# Patient Record
Sex: FEMALE | Race: WHITE | NOT HISPANIC OR LATINO | URBAN - METROPOLITAN AREA
[De-identification: names, ages, dates, MRNs, and addresses within clinical notes are randomized per-mention and may not be internally consistent; named-entity substitution may affect disease eponyms.]

---

## 2017-07-11 ENCOUNTER — AMBULATORY - RIVER FALLS (OUTPATIENT)
Dept: FAMILY MEDICINE | Facility: CLINIC | Age: 52
End: 2017-07-11

## 2017-07-18 ENCOUNTER — OFFICE VISIT - RIVER FALLS (OUTPATIENT)
Dept: FAMILY MEDICINE | Facility: CLINIC | Age: 52
End: 2017-07-18

## 2017-07-18 ASSESSMENT — MIFFLIN-ST. JEOR: SCORE: 1505.54

## 2018-02-05 ENCOUNTER — AMBULATORY - RIVER FALLS (OUTPATIENT)
Dept: FAMILY MEDICINE | Facility: CLINIC | Age: 53
End: 2018-02-05

## 2018-02-06 LAB — HBA1C MFR BLD: 5.6 %

## 2018-02-14 ENCOUNTER — OFFICE VISIT - RIVER FALLS (OUTPATIENT)
Dept: FAMILY MEDICINE | Facility: CLINIC | Age: 53
End: 2018-02-14

## 2018-02-14 ASSESSMENT — MIFFLIN-ST. JEOR: SCORE: 1388.51

## 2018-10-11 ENCOUNTER — AMBULATORY - RIVER FALLS (OUTPATIENT)
Dept: FAMILY MEDICINE | Facility: CLINIC | Age: 53
End: 2018-10-11

## 2018-10-12 LAB
CHOLEST SERPL-MCNC: 201 MG/DL
CHOLEST/HDLC SERPL: 4.9 {RATIO}
CREAT SERPL-MCNC: 0.71 MG/DL (ref 0.5–1.05)
GLUCOSE BLD-MCNC: 111 MG/DL (ref 65–99)
HBA1C MFR BLD: 5.8 %
HDLC SERPL-MCNC: 41 MG/DL
LDLC SERPL CALC-MCNC: 119 MG/DL
NONHDLC SERPL-MCNC: 160 MG/DL
TRIGL SERPL-MCNC: 269 MG/DL

## 2018-10-17 ENCOUNTER — OFFICE VISIT - RIVER FALLS (OUTPATIENT)
Dept: FAMILY MEDICINE | Facility: CLINIC | Age: 53
End: 2018-10-17

## 2019-04-30 ENCOUNTER — COMMUNICATION - RIVER FALLS (OUTPATIENT)
Dept: FAMILY MEDICINE | Facility: CLINIC | Age: 54
End: 2019-04-30

## 2019-05-08 ENCOUNTER — AMBULATORY - RIVER FALLS (OUTPATIENT)
Dept: FAMILY MEDICINE | Facility: CLINIC | Age: 54
End: 2019-05-08

## 2019-05-09 LAB
BUN SERPL-MCNC: 17 MG/DL (ref 7–25)
BUN/CREAT RATIO - HISTORICAL: ABNORMAL (ref 6–22)
CALCIUM SERPL-MCNC: 10.4 MG/DL (ref 8.6–10.4)
CHLORIDE BLD-SCNC: 103 MMOL/L (ref 98–110)
CO2 SERPL-SCNC: 29 MMOL/L (ref 20–32)
CREAT SERPL-MCNC: 0.78 MG/DL (ref 0.5–1.05)
EGFRCR SERPLBLD CKD-EPI 2021: 86 ML/MIN/1.73M2
GLUCOSE BLD-MCNC: 117 MG/DL (ref 65–99)
HBA1C MFR BLD: 6 %
POTASSIUM BLD-SCNC: 4.4 MMOL/L (ref 3.5–5.3)
SODIUM SERPL-SCNC: 142 MMOL/L (ref 135–146)

## 2019-05-16 ENCOUNTER — COMMUNICATION - RIVER FALLS (OUTPATIENT)
Dept: FAMILY MEDICINE | Facility: CLINIC | Age: 54
End: 2019-05-16

## 2019-05-21 ENCOUNTER — OFFICE VISIT - RIVER FALLS (OUTPATIENT)
Dept: FAMILY MEDICINE | Facility: CLINIC | Age: 54
End: 2019-05-21

## 2019-05-21 ASSESSMENT — MIFFLIN-ST. JEOR: SCORE: 1395.77

## 2019-05-22 ENCOUNTER — COMMUNICATION - RIVER FALLS (OUTPATIENT)
Dept: FAMILY MEDICINE | Facility: CLINIC | Age: 54
End: 2019-05-22

## 2019-07-15 ENCOUNTER — COMMUNICATION - RIVER FALLS (OUTPATIENT)
Dept: FAMILY MEDICINE | Facility: CLINIC | Age: 54
End: 2019-07-15

## 2019-07-16 ENCOUNTER — COMMUNICATION - RIVER FALLS (OUTPATIENT)
Dept: FAMILY MEDICINE | Facility: CLINIC | Age: 54
End: 2019-07-16

## 2019-10-16 ENCOUNTER — COMMUNICATION - RIVER FALLS (OUTPATIENT)
Dept: FAMILY MEDICINE | Facility: CLINIC | Age: 54
End: 2019-10-16

## 2020-03-10 ENCOUNTER — COMMUNICATION - RIVER FALLS (OUTPATIENT)
Dept: FAMILY MEDICINE | Facility: CLINIC | Age: 55
End: 2020-03-10

## 2020-03-30 ENCOUNTER — COMMUNICATION - RIVER FALLS (OUTPATIENT)
Dept: FAMILY MEDICINE | Facility: CLINIC | Age: 55
End: 2020-03-30

## 2021-05-25 ENCOUNTER — RECORDS - HEALTHEAST (OUTPATIENT)
Dept: ADMINISTRATIVE | Facility: CLINIC | Age: 56
End: 2021-05-25

## 2022-02-11 VITALS
DIASTOLIC BLOOD PRESSURE: 88 MMHG | BODY MASS INDEX: 29.96 KG/M2 | TEMPERATURE: 98.9 F | HEIGHT: 65 IN | SYSTOLIC BLOOD PRESSURE: 144 MMHG | HEART RATE: 76 BPM | WEIGHT: 179.8 LBS

## 2022-02-11 VITALS
HEART RATE: 68 BPM | BODY MASS INDEX: 34.26 KG/M2 | SYSTOLIC BLOOD PRESSURE: 124 MMHG | TEMPERATURE: 98 F | WEIGHT: 205.6 LBS | HEIGHT: 65 IN | DIASTOLIC BLOOD PRESSURE: 80 MMHG

## 2022-02-11 VITALS
DIASTOLIC BLOOD PRESSURE: 80 MMHG | WEIGHT: 181.4 LBS | SYSTOLIC BLOOD PRESSURE: 136 MMHG | HEIGHT: 65 IN | HEART RATE: 64 BPM | BODY MASS INDEX: 30.22 KG/M2 | TEMPERATURE: 97.8 F

## 2022-02-11 VITALS
BODY MASS INDEX: 30.69 KG/M2 | TEMPERATURE: 98.5 F | DIASTOLIC BLOOD PRESSURE: 88 MMHG | WEIGHT: 181.6 LBS | SYSTOLIC BLOOD PRESSURE: 164 MMHG | SYSTOLIC BLOOD PRESSURE: 144 MMHG | DIASTOLIC BLOOD PRESSURE: 80 MMHG | HEART RATE: 64 BPM

## 2022-02-16 NOTE — CARE COORDINATION
Pt appears on [facility]TG chronic disease panel as out of parameters for BP/A1C/LDL.  Pt was seen 10/17/2018, /80, Labs 10/11/18 , A1C 5.8, has RTC 6 mo for CDV/labs.  Pt involved with BP management program.

## 2022-02-16 NOTE — TELEPHONE ENCOUNTER
---------------------  From: Massiel Celeste RN (Phone Messages Pool ([id number]_[facility]))   To: Micaela Huynh;     Sent: 3/30/2020 9:23:37 AM CDT  Subject: FW: Medical Record Authorization           ---------------------  From: BRENDA ANDRADE  To: [facility]  Sent: 03/30/2020 09:21 a.m. CDT  Subject: Medical Record Authorization  Attached is my authorization form to transfer my records to [facility].  The fax number is the headquarters in [place] where the records are all kept and is their preferred way to receive records. Let me know if you have any questions.  Thank you.  ---------------------  From: Micaela Huynh  To: BRENDA ANDRADE  Sent: 3/10/2020 12:45:03 PM CDT  Subject: General Message       Brenda,       In order to transfer records you will need to sign an authorization for Disclosure form. You can either fill one out at the [facility], print one off our website ([email or web address], go to your visit, patient forms and then Authorization for use & disclosure of patient health information) and mail or fax it to us or you can sign one here at [facility]. I can also mail one to you if needed, please let me know your address if you need one mailed.  Once we receive this then we can release records.       Any questions call Medical Records @ [phone]       Thanks,       Micaela Huynh, Release of Information/Transcription   [address] [address]  [phone] DIRECT  [phone] MAIN  [phone] FAX  [email or web address] Brenda @ 9:53am. received authorization and will transfer records today. thanks

## 2022-02-16 NOTE — LETTER
(Inserted Image. Unable to display)       April 18, 2019      PAMROBERT ANDRADE  [address] [place], [place] [id number]          Dear PAM,      Thank you for selecting [facility] for your healthcare needs.     Our records indicate you are due for the following services:    Non-Fasting Labs    If you had your labs done at another facility or with Direct Access Lab Testing at [facility], please bring in a copy of the results to your next visit, mail a copy, or drop off a copy of your results to your Healthcare Provider.    Medication Check  Diabetic Exam ~ Please bring your glucose meter and/or your blood glucose diary to your appointment.  Hypertension Check ~ Please remember to bring any at-home blood pressure readings with you to your appointment.    You are due for lab work and an office visit; please schedule the lab appointment 1 week before the office visit.  This will assure all results are available to discuss with your Healthcare Provider during your visit.    **It is very helpful if you bring your medication bottles to your appointment.  This assures we have all of your current medications, including strength and dosing information, documented accurately in your medical record.    To schedule an appointment or if you have further questions, please contact your primary clinic:   [facility]       [phone]   [facility]       [phone]              [facility]     [phone]    Powered by Health and [employer]    Sincerely,    Truong Fairbanks MD

## 2022-02-16 NOTE — TELEPHONE ENCOUNTER
Entered by Anny Lozada on January 15, 2021 8:17:34 AM CST  This is done      ---------------------  From: Stephen BENEDICT, Gretchen   To: [facility] Pool ([id number]);     Sent: 1/14/2021 3:33:03 PM CST  Subject: General Message     Please remove pt from GTG pt list.  Thanks.

## 2022-02-16 NOTE — TELEPHONE ENCOUNTER
Entered by Graciela Pendleton CMA on May 04, 2020 2:09:49 PM CDT  ---------------------  From: Graciela Pendleton CMA   To: [facility]    Sent: 5/4/2020 2:09:49 PM CDT  Subject: Medication Management     ** Not Approved: Patient needs appointment, due for yearly visit **  hydrochlorothiazide-lisinopril (LISINOP/HCTZ TAB 10-12.5)  TAKE 2 TABLETS DAILY  Qty:  180 tab(s)        Days Supply:  90        Refills:  3          Substitutions Allowed     Route To Pharmacy - [facility]   Signed by Graciela Pendleton CMA            ------------------------------------------  From: [facility]  To: Truong Fairbanks MD  Sent: May 4, 2020 1:46:54 AM CDT  Subject: Medication Management  Due: May 5, 2020 1:46:54 AM CDT    ** On Hold Pending Signature **  Drug: hydrochlorothiazide-lisinopril (hydrochlorothiazide-lisinopril 12.5 mg-10 mg oral tablet)  TAKE 2 TABLETS DAILY  Quantity: 180 tab(s)  Days Supply: 90  Refills: 3  Substitutions Allowed  Notes from Pharmacy:     Dispensed Drug: hydrochlorothiazide-lisinopril (hydrochlorothiazide-lisinopril 12.5 mg-10 mg oral tablet)  TAKE 2 TABLETS DAILY  Quantity: 180 tab(s)  Days Supply: 90  Refills: 3  Substitutions Allowed  Notes from Pharmacy:   ------------------------------------------

## 2022-02-16 NOTE — TELEPHONE ENCOUNTER
---------------------From: Gwen Portillo LPN ([place] Orders Messages Pool ([address] [place] [address])) To: xaitment Pool ([address] [place] [address]);   Sent: 7/15/2019 9:06:35 AM CDTSubject: FW: consumer message Dr. Fairbanks ---------------------From: PAM Ty: [facility] BaptistSent: 07/15/2019 08:09 a.m. CDTSubject: Dr. Nora Fairbanks, can you please sign the attached form and fax it to [employer] [employer] for my 2020 insurance discount please?  I forgot to have you sign it when I was in May 21.  It needs to be received by August 23rd.Also, following up on BP readings: on 5/28 it was 121/79, on 5/30 it was 112/80, on 6/19 it was 117/75, on 7/3 it was 122/82.  Doing well with my BP Cuff.  I have also lost 8 more pounds.Thank you,Karlene---------------------From: Lorena Haynes LPN ([employer] Message Pool ([address] [place] [address])) To: Truong Fairbanks MD;   Sent: 7/15/2019 9:16:32 AM CDTSubject: FW: consumer message Dr. Fairbanks---------------------From: Truong Fairbanks MD To: xaitment Pool ([id number]);   Sent: 7/15/2019 5:31:58 PM CDTSubject: RE: consumer message Dr. Fairbanks signedLVM for patient notified that form was signed and [facility] will be faxing form.

## 2022-02-16 NOTE — TELEPHONE ENCOUNTER
---------------------  From: Truong Fairbanks MD   To: PAM ANDRADE    Sent: 5/16/2019 7:56:17 AM CDT  Subject: Patient Message - Results Notification        Your tests look good.  We will discuss the results at your upcoming visit.    Results:  Date Result Name Ind Value Ref Range   5/8/2019 7:58 AM Sodium Level  142 mmol/L (135 - 146)   5/8/2019 7:58 AM Potassium Level  4.4 mmol/L (3.5 - 5.3)   5/8/2019 7:58 AM Chloride Level  103 mmol/L (98 - 110)   5/8/2019 7:58 AM CO2 Level  29 mmol/L (20 - 32)   5/8/2019 7:58 AM Glucose Level ((H)) 117 mg/dL (65 - 99)   5/8/2019 7:58 AM BUN  17 mg/dL (7 - 25)   5/8/2019 7:58 AM Creatinine Level  0.78 mg/dL (0.50 - 1.05)   5/8/2019 7:58 AM eGFR  86 mL/min/1.73m2 (> OR = 60 - )   5/8/2019 7:58 AM Calcium Level  10.4 mg/dL (8.6 - 10.4)   5/8/2019 8:00 AM Hgb A1c ((H)) 6.0 ( - <5.7)

## 2022-02-16 NOTE — PROGRESS NOTES
Patient:   PAM ANDRADE            MRN: [id number]            FIN: [id number]               Age:   51 years     Sex:  Female     :  1965   Associated Diagnoses:   Type 2 diabetes mellitus; Essential Hypertension; GERD (Gastroesophageal Reflux Disease); Hyperlipidemia NOS   Author:   Truong Fairbanks MD      Visit Information      Date of Service: 2017 07:46 am  Performing Location: [facility]  Encounter#: [id number]      Primary Care Provider (PCP):  Truong Fairbanks MD    NPI# [id number]   Visit type:  On-going care, Scheduled follow-up.         Medication: Follow-up, Refill.    Source of history:  Self, Medical record.    History limitation:  None.       Chief Complaint   2017 7:52 AM CDT    DM/HTN check, f/u labs, refill meds.   also rck mole on upper left thigh.      History of Present Illness             The patient presents for follow-up evaluation of diabetes.  The quality of the patient's diabetes is described as increased hyperglycemic episodes.  Exacerbating factors consist of missed medication, noncompliance with diet and sedentary lifestyle.  Relieving factors consist of medication.  Associated symptoms consist of none.  Compliance problems: with weight management.  Glucose results: rarely checks glucose.  Hemoglobin A1c results: elevated.  Lifestyle modification: weight reduction, diet, increased physical activity.  Medications: metformin .               The patient presents for follow-up evaluation of hypertension.  The quality of hypertension symptom(s) since the patient's last visit is described as improved.  The severity of the hypertension symptom(s) since the last visit is mild.  Since the patient's last visit, the timing/course of hypertension symptom(s) is improving.  The context of the hypertension: blood pressure was maintained within the target range.  Exacerbating factors consist of noncompliance with diet.  Relieving factors consist of medication.   Associated symptoms consist of none.        Interval History   Cholesterol Management   Total cholesterol results 200-239 mg/dL (borderline high).  LDL cholesterol results < 100 mg/dL (optimal).  HDL cholesterol results < 40 mg/dL (low).  Triglyceride results 200-499 mg/dL (high).  The course is improving.        Review of Systems   Constitutional:  No fever, No chills, No sweats, No weakness, No fatigue.    Eye:  No recent visual problem.    Ear/Nose/Mouth/Throat:  No decreased hearing, No nasal congestion, No sore throat.    Respiratory:  No shortness of breath, No cough.    Cardiovascular:  Negative, No chest pain, No palpitations, No peripheral edema.    Gastrointestinal:  No nausea, No vomiting, No diarrhea, No constipation, No heartburn.    Genitourinary:  No dysuria, No change in urine stream.    Hematology/Lymphatics:  No bruising tendency, No bleeding tendency.    Endocrine:  No cold intolerance, No heat intolerance.    Immunologic:  Negative.    Musculoskeletal:  No back pain, No neck pain, No joint pain, No muscle pain.    Integumentary:  No rash, No dryness, No skin lesion.    Neurologic:  Alert and oriented X4, No headache.    Psychiatric:  No anxiety, No depression.       Health Status   Allergies:    Allergic Reactions (Selected)  No known allergies   Medications:  (Selected)   Prescriptions  Prescribed  atorvastatin 40 mg oral tablet: 1 tab(s) ( 40 mg ), po, daily, # 90 tab(s), 2 Refill(s), Type: Maintenance, Pharmacy: [facility], Pt prefers 3months at a time., 1 tab(s) po daily  hydrochlorothiazide 25 mg oral tablet: 1 tab(s) ( 25 mg ), PO, Daily, # 90 tab(s), 3 Refill(s), Type: Maintenance, Pharmacy: [facility], 1 tab(s) po daily  lisinopril 10 mg oral tablet: 1 tab(s) ( 10 mg ), PO, Daily, # 90 tab(s), 0 Refill(s), Type: Maintenance, Pharmacy: [facility], 1 tab(s) po daily  metFORMIN 500 mg oral tablet: 1 tab(s) ( 500 mg ), po,  bid, # 180 tab(s), 1 Refill(s), Type: Maintenance, [facility]  omeprazole 40 mg oral delayed release capsule: 1 cap(s) ( 40 mg ), PO, Daily, # 90 cap(s), 3 Refill(s), Type: Soft Stop, Pharm[facility], 1 cap(s) po daily  Documented Medications  Documented  aspirin: ( 81 mg ), Daily, 0 Refill(s)   Problem list:    All Problems  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  BCC (basal cell carcinoma) / SNOMED CT [id number] / Confirmed  Diverticulosis / SNOMED CT 3462366651 / Confirmed  Hyperlipidemia NOS / ICD-9-.4 / Confirmed  Dysplasia of cervix / SNOMED CT 213341032 / Confirmed  Essential Hypertension / ICD-9-.9 / Confirmed  GERD (Gastroesophageal Reflux Disease) / ICD-9-.81 / Confirmed  Mild Depression / ICD-9-.21 / Confirmed  Obesity / ICD-9-.00 / Probable  JOYCELYN (obstructive sleep apnea) / SNOMED CT 590055078 / Confirmed  Tubular adenoma / SNOMED CT 1377273900 / Confirmed  Inactive: Abnormal Pap smear of cervix / SNOMED CT 5337925544  Resolved: Pregnancy / SNOMED CT 769781129  Resolved: Pregnancy / SNOMED CT 098889477      Histories   Past Medical History:    Active  Dysplasia of cervix (890494032): Onset on 2004 at 38 years.  Comments:  1/10/2011 CST 1:06 PM CST - Doe NAVA, Lucita  Treated with LEEP.  Hyperlipidemia NOS (272.4)  GERD (Gastroesophageal Reflux Disease) (530.81)  Mild Depression (296.21)  Congenital duplication of right ureter  Resolved  Pregnancy (113063912): Onset on 1/10/1991 at 25 years.  Resolved on 1991 at 25 years.  Pregnancy (644014296):  Resolved on 1984 at 19 years.   Family History:    Alcohol Abuse  Father ()  Grandfather (P)  Cancer  Mother (Unknown, )  Comments:  2010 4:32 PM - Thi CMA, Kyara  Lung  Gout  Grandmother (P) (Unknown)  Liver cancer  Father ()  HTN - Hypertension  Father  ()  Diabetes  Grandmother (P) (Unknown)  Comments:  2014 10:33 AM - Manuela Proctor  AODM     Procedure history:    Colonoscopy (SNOMED CT 850426858) on 10/30/2014 at 49 Years.  Comments:  2014 8:22 AM - Cheli Head CMA  Indication: screening  Sedation: MAC  Tubular adenoma  Diverticulosis  Repeat in 5 years  Pap smear (SNOMED CT 118787824) in the month of 2009 at 44 Years.  Comments:  2010 4:37 PM - MIL CALDERON  NL  Polysomnogram on 2008 at 43 Years.  Comments:  2014 10:15 AM - Manuela Proctor  Moderate apnea with successful titration at 9 cm.  Mammography; bilateral (CPT-4 97656) in the month of 10/2008 at 43 Years.  Hysterectomy (SNOMED CT 383990416) on 2004 at 39 Years.  Comments:  2010 4:34 PM - MIL CALDERON  with cholecystectomy  LEEP (SNOMED CT 92054210) in the month of 2004 at 38 Years.  Comments:  2010 4:33 PM - MIL CALDERON  Done at [facility] with Dr. Thakur  Cone biopsy of cervix (SNOMED CT 56656478) in  at 25 Years.  Comments:  2014 10:28 AM - Manuela Proctor  Moderate dysplasia  Cholecystectomy (SNOMED CT 38196467).   Social History:        Alcohol Assessment: Current                     Comments:                      2013 - Lori Ziegler CMA                     Daily      Tobacco Assessment: Current                     Comments:                      2013 - Lori Ziegler CMA                     1 pack per day      Substance Abuse Assessment: Denies Substance Abuse      Employment and Education Assessment            Employed, Work/School description: 3[employer].      Exercise and Physical Activity Assessment: Regular exercise            Exercise frequency: 3-4 times/week.  Exercise type: Walking, Weight lifting.                     Comments:                      2013 Lori Luna CMA                     Walks dog a few miles      Sexual Assessment            Sexually active: Yes.        Physical Examination   Vital Signs    7/18/2017 7:52 AM CDT Temperature Tympanic 98 DegF    Peripheral Pulse Rate 68 bpm    Pulse Site Radial artery    HR Method Manual    Systolic Blood Pressure 124 mmHg    Diastolic Blood Pressure 80 mmHg    Mean Arterial Pressure 95 mmHg    BP Site Right arm    BP Method Manual      Measurements from flowsheet : Measurements   7/18/2017 7:52 AM CDT Height Measured - Standard 64.5 in    Weight Measured - Standard 205.6 lb    BSA 2.06 m2    Body Mass Index 34.74 kg/m2      General:  Alert and oriented, No acute distress.    Eye:  Normal conjunctiva, Vision unchanged.    HENT:  Normocephalic, Tympanic membranes are clear, Oral mucosa is moist, No pharyngeal erythema.    Neck:  Supple, Non-tender, No carotid bruit, No lymphadenopathy, No thyromegaly.    Respiratory:  Lungs are clear to auscultation, Respirations are non-labored.    Cardiovascular:  Normal rate, Regular rhythm, Normal peripheral perfusion.    Gastrointestinal:  Soft, Non-tender.    Genitourinary:  No costovertebral angle tenderness.    Musculoskeletal:  Normal range of motion, Normal strength.    Integumentary:  Warm, Dry, No rash, SK left posterior thigh.    Feet:  Normal by visual exam, Normal pulses, Sensation intact.         Sensory status: Bilateral, Within normal limits, By monofilament exam.    Neurologic:  Alert, Oriented.    Psychiatric:  Cooperative, Appropriate mood & affect.       Review / Management   Results review:  Lab results   7/11/2017 8:10 AM CDT Sodium Level 138 mmol/L    Potassium Level 4.1 mmol/L    Chloride Level 101 mmol/L    CO2 Level 23 mmol/L    Glucose Level 138 mg/dL  HI    BUN 15 mg/dL    Creatinine 0.75 mg/dL    BUN/Creat Ratio NOT APPLICABLE    eGFR 91 mL/min/1.73m2    eGFR African American 105 mL/min/1.73m2    Calcium Level 10.0 mg/dL    Hgb A1c 7.0  HI    Cholesterol 203 mg/dL  HI    Non-  HI    HDL 39 mg/dL  LOW    Chol/HDL Ratio 5.2  HI    LDL 90    Triglyceride 372 mg/dL  HI   .       Impression and Plan    Diagnosis     Type 2 diabetes mellitus (NXH71-XU E11.9).     Essential Hypertension (LOR67-BY I10).     GERD (Gastroesophageal Reflux Disease) (SSC40-HO K21.9).     Hyperlipidemia NOS (QBN32-BC E78.4).     Course:  Progressing as expected.    Plan:  Enroll in disease management program, Weight monitoring, increase metformin to 2000 mg daily.         Diet: American Diabetes Association meal plan.    Patient Instructions:       Counseled: Patient, Regarding diagnosis, Regarding treatment ( Diabetes ( Blood glucose monitoring device, Carbohydrate restricted diet, Medication instruction, Referral for education/management program, Symptom management, Weight monitoring ) ).    Patient Instructions:       Counseled: Regarding treatment ( Hypertension ( Diet management, Medication management, Physical activity, Smoking cessation, Weight management ) ).    Orders     Orders (Selected)   Prescriptions  Prescribed  hydrochlorothiazide-lisinopril 12.5 mg-10 mg oral tablet: 1 tab(s), PO, Daily, # 90 tab(s), 3 Refill(s), Type: Maintenance, Pharmacy: [facility], 1 tab(s) po daily  metFORMIN 1000 mg oral tablet, extended release: 2 tab(s) ( 2,000 mg ), po, daily, # 180 tab(s), 3 Refill(s), Type: Maintenance, Pharmacy: [facility], 2 tab(s) po daily,x90 day(s)  omeprazole 40 mg oral delayed release capsule: 1 cap(s) ( 40 mg ), PO, Daily, # 90 cap(s), 3 Refill(s), Type: Soft Stop, Pharmacy: [facility], 1 cap(s) po daily.

## 2022-02-16 NOTE — NURSING NOTE
Patient with humza size hematoma post blood draw. Declined cool compress. Will observe for resolve or be seen.

## 2022-02-16 NOTE — TELEPHONE ENCOUNTER
Entered by Nasrin Livingston CMA on March 27, 2019 8:23:43 AM CDT  Date of last office visit and reason:  10/17/18 CDM w/ GTG      Date of last Med Check / Px:   10/17/18  Date of last labs pertaining to med:  Lipids 10/11/18     RTC order in chart:  Due back 4/17/19 for labs and OV for CDM. One month protocol given today.          ------------------------------------------  From: [facility]  To: Truong Fairbanks MD  Sent: March 27, 2019 12:38:26 AM CDT  Subject: Medication Management  Due: March 28, 2019 12:38:26 AM CDT    ** On Hold Pending Signature **  Drug: atorvastatin (atorvastatin 40 mg oral tablet)  TAKE 1 TABLET DAILY (ONE   MONTH PROTOCOL, APPOINTMENTDUE)  Quantity: 90 tab(s)     Days Supply: 90        Refills: 1  Substitutions Allowed  Notes from Pharmacy:     Dispensed Drug: atorvastatin (atorvastatin 40 mg oral tablet)  TAKE 1 TABLET DAILY (ONE   MONTH PROTOCOL, APPOINTMENTDUE)  Quantity: 90 tab(s)     Days Supply: 90        Refills: 1  Substitutions Allowed  Notes from Pharmacy:   ---------------------------------------------------------------  From: Nasrin Livingston CMA   To: [facility]    Sent: 3/27/2019 8:24:37 AM CDT  Subject: Medication Management     ** Submitted: **  Order:atorvastatin (atorvastatin 40 mg oral tablet)  1 tab(s)  Oral  daily  Qty:  30 tab(s)        Refills:  0          Substitutions Allowed     Route To Pharmacy - [facility]    Signed by Nasrin Livingston CMA  3/27/2019 8:23:00 AM    ** Submitted: **  Complete:atorvastatin (atorvastatin 40 mg oral tablet)   Signed by Nasrin Livingston CMA  3/27/2019 8:24:00 AM    ** Not Approved:  **  atorvastatin (ATORVASTATIN TAB 40MG)  TAKE 1 TABLET DAILY (ONE   MONTH PROTOCOL, APPOINTMENTDUE)  Qty:  90 tab(s)        Days Supply:  90        Refills:  1          Substitutions Allowed     Route To Pharmacy - [facility]   Signed by Nasrin Livingston CMA

## 2022-02-16 NOTE — PROGRESS NOTES
Chief Complaint    DM check, f/u labs, due for med refills. discuss d/c Metformin  History of Present Illness      Here for follow up on diabetes and HTN.  Her blood glucose numbers have been under good control.  She request to discontinue metformin.  She is only been taking 1000 milligrams a day.  She has lost about 30 pounds and has been exercising daily.  She is also significantly changed her diet.  Latest hemoglobin A1c was 5.6.       Blood pressure is been under control at home.  Is a bit high today.  Review of Systems          Constitutional:  No fever, No chills, No sweats, No weakness, No fatigue.            Eye:  No recent visual problem.            Ear/Nose/Mouth/Throat:  No decreased hearing, No nasal congestion, No sore throat.            Respiratory:  No shortness of breath, No cough.            Cardiovascular:  Negative, No chest pain, No palpitations, No peripheral edema.            Gastrointestinal:  No nausea, No vomiting, No diarrhea, No constipation, No heartburn.            Genitourinary:  No dysuria, No change in urine stream.            Hematology/Lymphatics:  No bruising tendency, No bleeding tendency.            Endocrine:  No cold intolerance, No heat intolerance.            Immunologic:  Negative.            Musculoskeletal:  No back pain, No neck pain, No joint pain, No muscle pain.            Integumentary:  No rash, No dryness, No skin lesion.            Neurologic:  Alert and oriented X4, No headache.                Psychiatric:  No anxiety, No depression  Physical Exam   Vitals & Measurements    T: 98.9(Tympanic)  HR: 76(Peripheral)  BP: 140/86     HT: 64.5 in  WT: 179.8 lb  BMI: 30.38       Alert, oriented, no acute distress      Lungs are clear      Heart has regular rate and rhythm       No lower extremity edema  Assessment/Plan       Diabetes mellitus, type 2        Improving        Continue with current plan and recheck in about 6 months.  She will discontinue metformin for now  and monitor blood glucose numbers.  If they start to rise will need to resume metformin.         Ordered:          Return to Clinic (Request), RFV: DM check w/ Hgb a1c and BRANDEE to be done 1wk prior to visit per [facility]TG., Return in 6 months          Return to Clinic (Request), RFV: DM check/med check and fasting labs 1wk prior--lipid panel, BMP, Hgb a1c, BRANDEE--per GTG verbal order., Return in 6 months                Hyperlipidemia NOS        Stable and will continue current medication         Ordered:          Return to Clinic (Request), RFV: DM check/med check and fasting labs 1wk prior--lipid panel, BMP, Hgb a1c, BRANDEE--per GTG verbal order., Return in 6 months           Patient Information     Name:PAM ANDRADE      Address:      [address] [place] [id number]     Sex:Female     YOB: 1965     [phone]     MRN:[id number]     FIN:[id number]     Location:[facility]     Date of Service:02/14/2018      Primary Care Physician:       Truong Fairbanks MD, [phone]  Problem List/Past Medical History    Ongoing     BCC (basal cell carcinoma)     Congenital duplication of right ureter     Diverticulosis     Dysplasia of cervix      Comments: Treated with LEEP.     Essential Hypertension     GERD (Gastroesophageal Reflux Disease)     Hyperlipidemia NOS     Obesity     JOYCELYN (obstructive sleep apnea)     Tubular adenoma      Comments: x1    Historical     Abnormal Pap smear of cervix      Comments: Also in 2003.     Mild Depression     Pregnancy     Pregnancy  Procedure/Surgical History     Colonoscopy (10/30/2014)     Pap smear (11/2009)     Polysomnogram (11/25/2008)     Mammography; bilateral (10/2008)     Hysterectomy (09/22/2004)     LEEP (02/2004)     Cone biopsy of cervix (1990)     Cholecystectomy  Medications     aspirin: 81 mg, Daily.     atorvastatin 40 mg oral tablet: 40 mg, 1 tab(s), po, daily, 30 tab(s), 0 Refill(s).     hydrochlorothiazide-lisinopril 12.5  mg-10 mg oral tablet: 1 tab(s), PO, Daily, 90 tab(s), 3 Refill(s).     metFORMIN 500 mg oral tablet: 1,000 mg, 2 tab(s), po, bid, for 90 day(s), 360 tab(s), 1 Refill(s).     omeprazole 40 mg oral delayed release capsule: 40 mg, 1 cap(s), PO, Daily, 90 cap(s), 3 Refill(s).      Allergies    No known allergies  Social History    Smoking Status - 02/14/2018     Former smoker     Alcohol - Current, 05/17/2013     Employment and Education - 05/17/2013      Employed, Work/School description: 3M.     Exercise and Physical Activity - Regular exercise, 05/17/2013      Exercise frequency: 3-4 times/week. Exercise type: Walking, Weight lifting.     Sexual - 05/17/2013      Sexually active: Yes.     Substance Abuse - Denies Substance Abuse, 05/17/2013     Tobacco - Current, 05/17/2013  Family History    Alcohol Abuse: Father and Grandfather (P).    Cancer: Mother.    Diabetes: Grandmother (P).    Gout: Grandmother (P).    HTN - Hypertension: Father.    Liver cancer: Father.  Immunizations      Vaccine Date Status      tetanus/diphth/pertuss (Tdap) adult/adol 09/08/2014 Given      typhoid, inactivated 09/11/2003 Recorded      MMR (measles/mumps/rubella) 03/04/1996 Recorded      Td 01/01/1996 Recorded      Hep B 04/25/1995 Recorded      Hep B 11/24/1994 Recorded      Hep B 10/24/1994 Recorded  Lab Results   Results (Last 90 days)             Laboratory                  Chemistry                       General Chemistry                            Hgb A1c                                     (02/05/18 07:52 AM CST)                                                                                                                                             5.6   (02/05/18 07:52 AM CST)                                                                                                                                  Random Urine Chem                            U Microalbumin:      0.5 mg/dL  (02/05/18 07:52 AM CST)                                                                                                                                        U Microalbumin/Creatinine:         (02/05/18 07:52 AM CST)                                                                                                                                  Timed Urine Chem                            Ur Creatinine:      L 13 mg/dL (Range 20 - 320)  (02/05/18 07:52 AM CST)                                                                                                                                       Ur Microalbumin/Creatinine Ratio:      H 38  (Range  - <30)  (02/05/18 07:52 AM CST)

## 2022-02-16 NOTE — LETTER
(Inserted Image. Unable to display)     November 22, 2019      PAM ANDRADE  [address] [place], [place] [id number]          Dear PAM,      Thank you for selecting [facility] ([facility]) for your healthcare needs.      Our records indicate you are due for the following services:     Non-Fasting Labs.    If you had your labs done at another facility or with Direct Access Lab Testing at [facility], please bring in a copy of the results to your next visit, mail a copy, or drop off a copy of your results to your Healthcare Provider.      To schedule an appointment or if you have further questions, please contact your primary clinic:   [facility]       [phone]   [facility]       [phone]              [facility]     [phone]      Powered by [employer] and [employer]    Sincerely,    Truong Fairbanks MD

## 2022-02-16 NOTE — TELEPHONE ENCOUNTER
---------------------From: Ryanne Langford RN (Phone Messages Pool ([id number]_[facility])) To: Truong Fairbanks MD;   Cc: [employer] Message Pool ([id number]_[facility]);    Sent: 5/22/2019 12:59:42 PM CDTSubject: FW: Dr. Fairbanks Follow-up Form ---------------------From: PAM Ty[facility] CenterSent: 05/22/2019 11:05 a.m. CDTSubject: Dr. Fairbanks Follow-up FormDr. Fairbanks, when I was there yesterday I forgot to ask you to fill out this form for my insurance.  Please fill it out and fax it to the number on the form.  Let me know if questions. Thank you!!---------------------From: Lorena Haynes LPN ([employer] Message Pool ([id number]_[facility])) To: Truong Fairbanks MD;   Sent: 5/22/2019 1:34:43 PM CDTSubject: FW: Dr. Fairbanks Follow-up Form Form is in your box

## 2022-02-16 NOTE — CARE COORDINATION
Pt appears on [facility] chronic disease panel as out of parameters for LDL.  Pt RTC in 8/2018 with no response, pt refill request 9/5/2018, got 30 days, informed need to be seen Ov/labs.  Sent RTC letter via pt portal.  Wait for response.

## 2022-02-16 NOTE — LETTER
(Inserted Image. Unable to display)   August 19, 2020        PAM LUPE  [address] [place], [place] [id number]        Dear PAM,      Thank you for selecting [facility] for your healthcare needs.    Our records indicate you are due for the following services:    Annual Physical  Fasting Lab Tests ~ Please do not eat or drink anything 10 hours prior to your scheduled appointment time.  (Water and any medications that you may need are allowed unless directed otherwise.)    If you had your labs done at another facility or with Direct Access Lab Testing at [facility], please bring in a copy of the results to your next visit, mail a copy, or drop off a copy of your results to your Healthcare Provider.    You are due for lab work and an office visit, please schedule the lab appointment 1 week before the office visit.  This will assure all results are available to discuss with your provider during your visit.    **It is very helpful if you bring your medication bottles to your appointment.  This assures we have all of your current medications, including strength and dosing information, documented accurately in your medical record.    To schedule an appointment or if you have further questions, please contact your primary clinic:   [facility]       [phone]   [facility]       [phone]              [facility]     [phone]      Powered by [employer] and [employer]    Sincerely,    Truong Fairbanks M.D.

## 2022-02-16 NOTE — TELEPHONE ENCOUNTER
---------------------  From: Nasrin Livingston CMA (Phone Messages Pool ([id number]_[facility]))   To: [employer]oo [employer] Message Pool ([id number]);     Sent: 1/17/2019 7:57:12 AM CST  Subject: FW: Dr. Fairbanks           ---------------------  From: BRENDA ANDRADE  To: [facility]  Sent: 01/17/2019 07:06 a.m. CST  Subject: Dr. Fairbanks  Hi Dr. Fairbanks, here are some of my recent blood pressure readings.  I have a cuff at home and have taken readings at various times.    12/29 Morning during coffee 164/90  1/6 Mid morning after coffee 136/86  1/8 Before bed, resting 128/86  1/9 Noon about 30 minutes after exercise 125/86  1/11 After exercise 30 minutes 120/84  1/12 Noon 118/77 then an hour later 125/81 then a little later same day 101/68  1/14 early morning 5 am 131/84 then at noon 127/87  1/15 Noon about 30 mins after exercise 132/75  1/16 right after getting home from work 151/99 then half hour later 142/91 and then 2 hours later 134/82  1/17 early morning 5 am 134/85    I can continue to record readings and send them to you if you want me to.  It s too far for me to come to [place] just to get my BP read so prefer this method of that s ok.---------------------  From: Stephen BENEDICT, Gretchen ([employer] Message Pool ([id number]_[facility]))   To: Truong Fairbanks MD;     Sent: 1/17/2019 1:47:28 PM CST  Subject: FW: Dr. Fairbanks---------------------  From: Truong Fairbanks MD   To: [employer] Message Pool ([id number]);     Sent: 1/17/2019 3:53:25 PM CST  Subject: RE: Dr. Fairbanks     Advise doubling dose of lisinopril/HCTZ and continue to monitor BP at home---------------------  From: Stpehen BENEDICT, Gretchen ([employer] Message Pool ([id number]_[facility]))   To: BRENDA ANDRADE    Sent: 1/17/2019 6:46:25 PM CST  Subject: FW: Dr. Fairbnaks     Good EveningBrenda Dr. Goblirsch received your message from earlier today and he is suggesting that you double the dose of the Lisinopril/HCTZ  to see if that helps lower your BP a little bit more.  Keep track of your BP and let us know in about 2-3 weeks how the increased dose is working.    Sincerely,      Gretchen PULIDO MA/Dr. Fairbanks  ** Submitted: **  Modify:hydrochlorothiazide-lisinopril (hydrochlorothiazide-lisinopril 12.5 mg-10 mg oral tablet)  2 tab(s)  PO  Daily  Qty:  90 tab(s)        Refills:  1          Substitutions Allowed     Route To Pharmacy - [facility]    Signed by Gretchen Mitchell MA  1/17/2019 6:46:00 PMRx modified in pt's med list, pt informed of dose change per GTG via portal.  No refill was sent in re: new dose.

## 2022-02-16 NOTE — TELEPHONE ENCOUNTER
---------------------From: Manuela Camacho CMA (Phone Messages Pool ([id number]_[facility])) To: [facility] Message Pool (32224_[facility]);   Sent: 7/16/2019 10:47:37 AM CDTSubject: FW: Dr. Fairbanks-consumer message ---------------------From: PAM Ty: [facility] HospitalSent: 07/16/2019 10:37 a.m. CDTSubject: Dr. Nora Fairbanks, regarding the form I emailed for you to sign and fax about my visit in May.  Attached is a letter from our Medical Director about the form.  Besides completing the assessment and visiting you, I have had 2 consultations with a nurse regarding nutrition and will have one more in August.  All of these things combined allow me to get a discount on insurance next year.  Let me know if you have any questions.  Thank you!---------------------From: Lorena Haynes LPN ([employer] Message Pool (32224_[facility])) To: Truong Fairbanks MD;   Sent: 7/16/2019 11:03:48 AM CDTSubject: FW: Dr. Fairbanks-consumer message [facility]

## 2022-02-16 NOTE — PROGRESS NOTES
Chief Complaint    f/u labs  History of Present Illness      Patient is here for follow-up on her chronic disease.  She had recent laboratory testing showing a normal BMP and hemoglobin A1c of 6.0.  This is up slightly from her last check.  She is off metformin.  She continues with blood pressure medication atorvastatin and omeprazole.  These problems are under control.  She tries to exercise every day.  She is smoking a bit.  She has lost weight and has managed to keep it off.  Overall she is feeling quite well.  Review of Systems      See HPI.  All other review of systems negative.  Physical Exam   Vitals & Measurements    T: 97.8   F (Tympanic)  HR: 64(Peripheral)  BP: 136/80     HT: 64.5 in  WT: 181.4 lb  BMI: 30.65       Alert, oriented, no acute distress      Neck is supple carotid bruit       Heart has a regular rate and rhythm, no murmur, no gallop no rub, normal peripheral perfusion       Lungs are clear to auscultation       Abdomen soft nontender       No lower extremity edema       Cooperative, appropriate affect  Assessment/Plan       Diabetes mellitus, type 2 (E11.9)         Well-controlled with hemoglobin A1c at 6.0        Continue with dietary management, exercise and repeat hemoglobin A1c in 6 months       Essential Hypertension (I10)         Well-controlled, home numbers are in range.  Continue with the current medication and recheck chemistries in 1 year       Hyperlipidemia NOS (E78.5)         Well-controlled, continue with atorvastatin  Patient Information     Name:PAM ANDRADE      Address:      [address] [place] [id number]     Sex:Female     YOB: 1965     [phone]     Emergency Contact:ABDULAZIZ CUEVAS     MRN:[id number]     FIN:[id number]     Location:[facility]     Date of Service:05/21/2019      Primary Care Physician:       Truong Fairbanks MD, [phone]      Attending Physician:       Truong Fairbanks MD, [phone]  Problem List/Past Medical History    Ongoing     BCC (basal cell carcinoma)     Congenital duplication of right ureter     Diabetes mellitus, type 2     Diverticulosis     Essential Hypertension     GERD (Gastroesophageal Reflux Disease)     Hyperlipidemia NOS     Obesity     JOYCELYN (obstructive sleep apnea)     Tubular adenoma       Comments: x1    Historical     Abnormal Pap smear of cervix       Comments: Also in 2003.     Dysplasia of cervix       Comments: Treated with LEEP.     Mild Depression     Pregnancy     Pregnancy  Procedure/Surgical History     Colonoscopy (10/30/2014)      Comments: Indication: screening      Sedation: MAC      Tubular adenoma      Diverticulosis      Repeat in 5 years.     Pap smear (11.2009)      Comments: NL.     Polysomnogram (11/25/2008)      Comments: Moderate apnea with successful titration at 9 cm..     Mammography; bilateral (10.2008)     Hysterectomy (09/22/2004)      Comments: with cholecystectomy.     LEEP (02.2004)      Comments: Done at [facility] with Dr. Thakur.     Cone biopsy of cervix (1990)      Comments: Moderate dysplasia.     Cholecystectomy  Medications        aspirin: 81 mg, Daily.        hydrochlorothiazide-lisinopril 12.5 mg-10 mg oral tablet: 2 tab(s), PO, Daily, 180 tab(s), 1 Refill(s).        omeprazole 40 mg oral delayed release capsule: 1 cap(s), Oral, daily, for 14 day(s), MED CHECK AND  FASTING LABS PER GTG., 14 cap(s), 0 Refill(s).        atorvastatin 40 mg oral tablet: 1 tab(s), Oral, daily, 14 tab(s), 0 Refill(s).         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 Exercise type: Walking, Weight lifting., 05/17/2013     Sexual      Sexually active: Yes.,  05/17/2013     Substance Abuse - Denies Substance Abuse, 05/17/2013     Tobacco - Current, 08/05/2010  Family History    Alcohol Abuse: Father and Grandfather (P).    Cancer: Mother.    Diabetes: Grandmother (P).    Gout: Grandmother (P).    HTN - Hypertension: Father.    Liver cancer: Father.    Sister: History is unknown    Brother: History is unknown    Son: History is unknown    Daughter: History is negative  Immunizations      Vaccine Date Status      tetanus/diphth/pertuss (Tdap) adult/adol 09/08/2014 Given      typhoid, inactivated 09/11/2003 Recorded      MMR (measles/mumps/rubella) 03/04/1996 Recorded      Td 01/01/1996 Recorded      Hep B 04/25/1995 Recorded      Hep B 11/24/1994 Recorded      Hep B 10/24/1994 Recorded  Lab Results          Lab Results (Last 4 results within 90 days)           Sodium Level: 142 mmol/L [135 mmol/L - 146 mmol/L] (05/08/19 07:58:00)          Potassium Level: 4.4 mmol/L [3.5 mmol/L - 5.3 mmol/L] (05/08/19 07:58:00)          Chloride Level: 103 mmol/L [98 mmol/L - 110 mmol/L] (05/08/19 07:58:00)          CO2 Level: 29 mmol/L [20 mmol/L - 32 mmol/L] (05/08/19 07:58:00)          Glucose Level: 117 mg/dL High [65 mg/dL - 99 mg/dL] (05/08/19 07:58:00)          BUN: 17 mg/dL [7 mg/dL - 25 mg/dL] (05/08/19 07:58:00)          Creatinine Level: 0.78 mg/dL [0.5 mg/dL - 1.05 mg/dL] (05/08/19 07:58:00)          BUN/Creat Ratio: NOT APPLICABLE [6  - 22] (05/08/19 07:58:00)          eGFR: 86 mL/min/1.73m2 (05/08/19 07:58:00)          eGFR African American: 99 mL/min/1.73m2 (05/08/19 07:58:00)          Calcium Level: 10.4 mg/dL [8.6 mg/dL - 10.4 mg/dL] (05/08/19 07:58:00)          Hgb A1c: 6 High (05/08/19 08:00:00)

## 2022-02-16 NOTE — TELEPHONE ENCOUNTER
Entered by Concepcion Kothari CMA on April 15, 2019 1:17:26 PM CDT  ---------------------  From: Concepcion oKthari CMA   To: [facility]    Sent: 4/15/2019 1:17:26 PM CDT  Subject: Medication Management     ** Submitted: **  Order:omeprazole (omeprazole 40 mg oral delayed release capsule)  1 cap(s)  Oral  daily  MED CHECK AND  FASTING LABS PER [facility]TG.  Qty:  14 cap(s)        Duration:  14 day(s)        Refills:  0          Substitutions Allowed     Route To Pharmacy - [facility]    Signed by Concepcion Kothari CMA  4/15/2019 1:17:00 PM    ** Submitted: **  Complete:omeprazole (omeprazole 40 mg oral delayed release capsule)   Signed by Concepcion Kothari CMA  4/15/2019 1:17:00 PM    ** Not Approved:  **  omeprazole (OMEPRAZOL DR CAP 40MG RX)  TAKE 1 CAPSULE DAILY (DUE  FOR 6 MONTH MED CHECK AND  FASTING LABS PER GTG)  Qty:  90 cap(s)        Days Supply:  90        Refills:  1          Substitutions Allowed     Route To Pharmacy - [facility]   Signed by Concepcion Kothari CMA            ------------------------------------------  From: [facility]  To: Truong Fairbanks MD  Sent: April 15, 2019 12:30:53 AM CDT  Subject: Medication Management  Due: April 16, 2019 12:30:53 AM CDT    ** On Hold Pending Signature **  Drug: omeprazole (omeprazole 40 mg oral delayed release capsule)  TAKE 1 CAPSULE DAILY (DUE  FOR 6 MONTH MED CHECK AND  FASTING LABS PER GTG)  Quantity: 90 cap(s)     Days Supply: 90        Refills: 1  Substitutions Allowed  Notes from Pharmacy:     Dispensed Drug: omeprazole (omeprazole 40 mg oral delayed release capsule)  TAKE 1 CAPSULE DAILY (DUE  FOR 6 MONTH MED CHECK AND  FASTING LABS PER GTG)  Quantity: 90 cap(s)     Days Supply: 90        Refills: 1  Substitutions Allowed  Notes from Pharmacy:   ------------------------------------------Date of last office visit and reason:  10/17/18 CKD f/u      Date of last Med Check / Px:   10/17/18  Date of  last labs pertaining to med:  10/11/18    RTC order in chart:  yes, patient past due for annual.    For Protocol refill, has patient been contacted:  LDVM for patient at 1315. Advised patient to make annual appt with [facility]TG. 2 week supply sent.

## 2022-02-16 NOTE — TELEPHONE ENCOUNTER
Entered by Cheli Head CMA on April 13, 2020 2:11:35 PM CDT  ---------------------  From: Cheli Head CMA   To: [facility]    Sent: 4/13/2020 2:11:35 PM CDT  Subject: Medication Management     ** Not Approved: Patient no longer under Prescriber care, Has transferred care to [facility] **  omeprazole (OMEPRAZOL DR CAP 40MG RX)  TAKE 1 CAPSULE DAILY  Qty:  90 cap(s)        Days Supply:  90        Refills:  3          Substitutions Allowed     Route To Pharmacy - [facility]   Signed by Cheli Head CMA            ** Not Approved: Patient no longer under Prescriber care, Has transferred to [facility] **  atorvastatin (ATORVASTATIN TAB 40MG)  TAKE 1 TABLET DAILY  Qty:  90 tab(s)        Days Supply:  90        Refills:  3          Substitutions Allowed     Route To Pharmacy - [facility]   Signed by Cheli Head CMA            ------------------------------------------  From: [facility]  To: Truong Fairbanks MD  Sent: April 12, 2020 2:56:23 AM CDT  Subject: Medication Management  Due: April 13, 2020 2:56:23 AM CDT    ** On Hold Pending Signature **  Drug: omeprazole (omeprazole 40 mg oral delayed release capsule)  TAKE 1 CAPSULE DAILY  Quantity: 90 cap(s)  Days Supply: 90  Refills: 3  Substitutions Allowed  Notes from Pharmacy:     Dispensed Drug: omeprazole (omeprazole 40 mg oral delayed release capsule)  TAKE 1 CAPSULE DAILY  Quantity: 90 cap(s)  Days Supply: 90  Refills: 3  Substitutions Allowed  Notes from Pharmacy:     ** On Hold Pending Signature **  Drug: atorvastatin (atorvastatin 40 mg oral tablet)  TAKE 1 TABLET DAILY  Quantity: 90 tab(s)  Days Supply: 90  Refills: 3  Substitutions Allowed  Notes from Pharmacy:     Dispensed Drug: atorvastatin (atorvastatin 40 mg oral tablet)  TAKE 1 TABLET DAILY  Quantity: 90 tab(s)  Days Supply: 90  Refills: 3  Substitutions Allowed  Notes from Pharmacy:    ------------------------------------------

## 2022-02-16 NOTE — CARE COORDINATION
Pt appears on [facility] chronic disease panel as out of parameters for HTN.  Pt involved with BP management program. Will place new RTC CSS BP due now.Pt has RTC 1/30/2019 CSS BP, pt sent portal message to [facility] with home numbers,   cancel RTC if [facility] will accept home numbers.

## 2022-02-16 NOTE — TELEPHONE ENCOUNTER
---------------------  From: Jacqui Daigle (Phone Messages Pool ([id number]_[facility]))   To: Micaela Huynh;     Sent: 3/10/2020 11:37:28 AM CDT  Subject: FW: Transfer Medical Records           ---------------------  From: PAM LOPEZ  To: [facility]  Sent: 03/10/2020 11:36 a.m. CDT  Subject: Transfer Medical Records  Dwight Fairbanks,  I am transferring my healthcare to the [facility] and will need to have 2-3 years of my records sent to them.  Can I do that through this messaging or do I need to call the office?  [place] is about 20 miles from my home and [place] is about 50 miles.  I need to doctore closer to home.  Thank you.  Karlene Lopez---------------------  From: Micaela Huynh   To: Phone Messages Pool ([id number]_[place]);     Sent: 3/10/2020 12:45:49 PM CDT  Subject: RE: Transfer Medical Records     Message sent to patient that she will need to sign a release and gave her options on how to do this. thanks

## 2022-02-16 NOTE — CARE COORDINATION
Pt appears on [facility] chronic disease panel as out of parameters for BP.  RTC 6 mo for CDV/labs.  Pt involved with BP management program.  RTC CSS BP 2 weeks

## 2022-02-16 NOTE — TELEPHONE ENCOUNTER
Entered by Concepcion Kothari CMA on April 15, 2019 1:15:09 PM CDT  ---------------------  From: Concepcion Kothari CMA   To: [facility]    Sent: 4/15/2019 1:15:09 PM CDT  Subject: Medication Management     ** Submitted: **  Order:atorvastatin (atorvastatin 40 mg oral tablet)  1 tab(s)  Oral  daily  Qty:  14 tab(s)        Refills:  0          Substitutions Allowed     Route To Pharmacy - [facility]    Signed by Concepcion Kothari CMA  4/15/2019 1:14:00 PM    ** Submitted: **  Complete:atorvastatin (atorvastatin 40 mg oral tablet)   Signed by Concepcion Kothari CMA  4/15/2019 1:15:00 PM    ** Not Approved:  **  atorvastatin (ATORVASTATIN TAB 40MG)  TAKE 1 TABLET DAILY (ONE   MONTH PROTOCOL, DUE FOR    LABS AND OFFICEVISIT IN 3 WEEKS)  Qty:  30 tab(s)        Days Supply:  30        Refills:  0          Substitutions Allowed     Route To Pharmacy - [facility]   Signed by Concepcion Kothari CMA            ------------------------------------------  From: [facility]  To: Truong Fairbanks MD  Sent: April 15, 2019 12:30:53 AM CDT  Subject: Medication Management  Due: April 16, 2019 12:30:53 AM CDT    ** On Hold Pending Signature **  Drug: atorvastatin (atorvastatin 40 mg oral tablet)  TAKE 1 TABLET DAILY (ONE   MONTH PROTOCOL, DUE FOR    LABS AND OFFICEVISIT IN 3 WEEKS)  Quantity: 30 tab(s)     Days Supply: 30        Refills: 0  Substitutions Allowed  Notes from Pharmacy:     Dispensed Drug: atorvastatin (atorvastatin 40 mg oral tablet)  TAKE 1 TABLET DAILY (ONE   MONTH PROTOCOL, DUE FOR    LABS AND OFFICEVISIT IN 3 WEEKS)  Quantity: 30 tab(s)     Days Supply: 30        Refills: 0  Substitutions Allowed  Notes from Pharmacy:   ------------------------------------------Date of last office visit and reason:  10/17/18 CKD f/u      Date of last Med Check / Px:   10/17/18  Date of last labs pertaining to med:  10/11/18    RTC order in chart:  yes, patient past due  for annual.    For Protocol refill, has patient been contacted:  FILI for patient at 1315. Advised patient to make annual appt with [facility]. 2 week supply sent.

## 2022-02-16 NOTE — TELEPHONE ENCOUNTER
---------------------  From: Anny Lozada (Appointment Pool ([id number]_[place]))   To: Gretchen Mitchell MA;     Sent: 1/15/2021 8:17:33 AM CST  Subject: RE: General Message     This is done        ---------------------  From: Gretchen Mitchell MA   To: Appointment Pool ([id number]_[place]);     Sent: 1/14/2021 3:33:03 PM CST  Subject: General Message     Please remove pt from [facility] pt list.  Thanks.

## 2022-02-16 NOTE — PROGRESS NOTES
"Chief Complaint    follow up visit  History of Present Illness      Patient here for follow up labs and medication check. Has not had any side effects from medication.      Doing well with diet, activity and weight maintenance      Labs reviewed  Review of Systems      \"See HPI.  All other review of systems negative.\"  Physical Exam   Vitals & Measurements    T: 98.5   F (Tympanic)  HR: 64(Peripheral)  BP: 144/80     HT: 64.5 in  WT: 181.6 lb           General:  Alert and oriented, No acute distress.            Eye:  Pupils are equal, round and reactive to light, Normal conjunctiva.            HENT:  Normocephalic, Oral mucosa is moist, No pharyngeal erythema.            Neck:  Supple, Non-tender, No lymphadenopathy.            Respiratory:  Lungs are clear to auscultation, Respirations are non-labored.            Cardiovascular:  Normal rate, Regular rhythm, Normal peripheral perfusion, No edema.            Gastrointestinal:  Soft, Non-tender, Non-distended, No organomegaly.            Musculoskeletal:  Normal range of motion, Normal strength, No swelling, Normal gait.            Integumentary:  Warm, No rash.            Feet:  Normal by visual exam, Normal pulses.                 Sensory status: Bilateral, Within normal limits, By monofilament exam.            Neurologic:  Alert, Oriented.            Psychiatric:  Cooperative, Appropriate mood & affect, Normal judgment.              Assessment/Plan       1. Obesity (E66.9)         Continue with diet and exercise.                2. Hyperlipidemia NOS (E78.5)          Stable, continue on current medication. Discussed labs today                3. Essential Hypertension (I10)          Stable, continue on current medication.  Discussed labs today                Diabetes mellitus, type 2 (E11.9)         diet controlled         Orders:          Return to Clinic (Request), RFV: DM check/med check and fasting labs 1wk prior--lipid panel, BMP, Hgb a1c, BRANDEE--per [facility] verbal " order., Return in 6 months                Orders:         atorvastatin, = 1 tab(s) ( 40 mg ), Oral, daily, # 90 tab(s), 1 Refill(s), Type: Maintenance, Pharmacy: [facility], One month protocol. Appt due, 1 tab(s) Oral daily, (Ordered)         hydrochlorothiazide-lisinopril, 1 tab(s), PO, Daily, # 90 tab(s), 1 Refill(s), Type: Maintenance, Pharmacy: [facility], Pt is due for fasting labs and 6month f/u per [facility]., 1 tab(s) Oral daily, (Ordered)         omeprazole, = 1 cap(s) ( 40 mg ), Oral, daily, # 90 cap(s), 1 Refill(s), Type: Maintenance, Pharmacy: [facility], Pt is due for 6month med check and fasting labs per [facility]., 1 cap(s) Oral daily, (Ordered)         Return to Clinic (Request), RFV: Medication check/refills, Return in 6 months, Instructions: 1 wk prior BMP, A1c      I, Lorena Haynes LPN, acted solely as a scribe for, and in the presence of Dr. Truong Fairbanks who performed the services.  Patient Information     Name:PAM ANDRADE      Address:      [address] [place], [place] [address]     Sex:Female     YOB: 1965     [phone]     MRN:[id number]     FIN:[id number]     Location:[facility]     Date of Service:10/17/2018      Primary Care Physician:       Truong Fairbanks MD, [phone]      Attending Physician:       Truong Fairbanks MD, [phone]  Problem List/Past Medical History    Ongoing     BCC (basal cell carcinoma)     Congenital duplication of right ureter     Diverticulosis     Essential Hypertension     GERD (Gastroesophageal Reflux Disease)     Hyperlipidemia NOS     Obesity     JOYCELYN (obstructive sleep apnea)     Tubular adenoma       Comments: x1    Historical     Abnormal Pap smear of cervix       Comments: Also in 2003.     Dysplasia of cervix       Comments: Treated with LEEP.     Mild Depression     Pregnancy     Pregnancy  Procedure/Surgical History      Colonoscopy (10/30/2014)            Comments:      Indication: screening      Sedation: MAC      Tubular adenoma      Diverticulosis      Repeat in 5 years     Pap smear (11/2009)            Comments:      NL     Polysomnogram (11/25/2008)            Comments:      Moderate apnea with successful titration at 9 cm.     Mammography; bilateral (10/2008)           Hysterectomy (09/22/2004)            Comments:      with cholecystectomy     LEEP (02/2004)            Comments:      Done at [facility] with Dr. Thakur     Cone biopsy of cervix (1990)            Comments:      Moderate dysplasia     Cholecystectomy        Medications     aspirin: 81 mg, Daily.     hydrochlorothiazide-lisinopril 12.5 mg-10 mg oral tablet: 1 tab(s), PO, Daily, 90 tab(s), 1 Refill(s).     omeprazole 40 mg oral delayed release capsule: 40 mg, 1 cap(s), Oral, daily, 90 cap(s), 1 Refill(s).     atorvastatin 40 mg oral tablet: 40 mg, 1 tab(s), Oral, daily, 90 tab(s), 1 Refill(s).          Allergies    No known allergies  Social History    Smoking Status - 10/17/2018     Current every day smoker     Alcohol - Current, 08/05/2010     Employment and Education      Employed, Work/School description: 3[employer]., 05/17/2013     Exercise and Physical Activity - Regular exercise, 05/17/2013      Exercise frequency: 3-4 times/week. Exercise type: Walking, Weight lifting., 05/17/2013     Sexual      Sexually active: Yes., 05/17/2013     Substance Abuse - Denies Substance Abuse, 05/17/2013     Tobacco - Current, 08/05/2010  Family History    Alcohol Abuse: Father and Grandfather (P).    Cancer: Mother.    Diabetes: Grandmother (P).    Gout: Grandmother (P).    HTN - Hypertension: Father.    Liver cancer: Father.    Daughter: History is negative    Sister: History is unknown    Brother: History is unknown    Son: History is unknown  Immunizations      Vaccine Date Status      tetanus/diphth/pertuss (Tdap) adult/adol 09/08/2014 Given      typhoid, inactivated  09/11/2003 Recorded      MMR (measles/mumps/rubella) 03/04/1996 Recorded      Td 01/01/1996 Recorded      Hep B 04/25/1995 Recorded      Hep B 11/24/1994 Recorded      Hep B 10/24/1994 Recorded  Lab Results       Lab Results (Last 4 results within 90 days)        Sodium Level: 140 mmol/L [135 mmol/L - 146 mmol/L] (10/11/18 08:06:00 CDT)       Potassium Level: 4.7 mmol/L [3.5 mmol/L - 5.3 mmol/L] (10/11/18 08:06:00 CDT)       Chloride Level: 105 mmol/L [98 mmol/L - 110 mmol/L] (10/11/18 08:06:00 CDT)       CO2 Level: 30 mmol/L [20 mmol/L - 32 mmol/L] (10/11/18 08:06:00 CDT)       Glucose Level: 111 mg/dL High [65 mg/dL - 99 mg/dL] (10/11/18 08:06:00 CDT)       BUN: 16 mg/dL [7 mg/dL - 25 mg/dL] (10/11/18 08:06:00 CDT)       Creatinine Level: 0.71 mg/dL [0.5 mg/dL - 1.05 mg/dL] (10/11/18 08:06:00 CDT)       BUN/Creat Ratio: NOT APPLICABLE [6  - 22] (10/11/18 08:06:00 CDT)       eGFR: 96 mL/min/1.73m2 [8.6 mg/dL - 10.4 mg/dL] (10/11/18 08:06:00 CDT)       eGFR African American: 111 mL/min/1.73m2 [6  - 22] (10/11/18 08:06:00 CDT)       Calcium Level: 10.6 mg/dL High [8.6 mg/dL - 10.4 mg/dL] (10/11/18 08:06:00 CDT)       Hgb A1c: 5.8 High [65 mg/dL - 99 mg/dL] (10/11/18 08:06:00 CDT)       Cholesterol: 201 mg/dL High [8.6 mg/dL - 10.4 mg/dL] (10/11/18 08:06:00 CDT)       Non-HDL Cholesterol: 160 High [20 mmol/L - 32 mmol/L] (10/11/18 08:06:00 CDT)       HDL: 41 mg/dL Low [65 mg/dL - 99 mg/dL] (10/11/18 08:06:00 CDT)       Cholesterol/HDL Ratio: 4.9 [0.5 mg/dL - 1.05 mg/dL] (10/11/18 08:06:00 CDT)       LDL: 119 High [0.5 mg/dL - 1.05 mg/dL] (10/11/18 08:06:00 CDT)       Triglyceride: 269 mg/dL High [8.6 mg/dL - 10.4 mg/dL] (10/11/18 08:06:00 CDT)       U Microalbumin: 1.2 mg/dL [6  - 22] (10/11/18 08:06:00 CDT)       Ur Creatinine: 66 mg/dL [20 mg/dL - 275 mg/dL] (10/11/18 08:06:00 CDT)       Ur Microalbumin/Creatinine Ratio: 18 [20 mg/dL - 275 mg/dL] (10/11/18 08:06:00 CDT)

## 2022-02-16 NOTE — NURSING NOTE
Quick Intake Entered On:  5/21/2019 8:03 AM CDT    Performed On:  5/21/2019 7:57 AM CDT by Valencia Mann               Summary   Chief Complaint :   f/u labs   Menstrual Status :   Hysterectomy   Weight Measured :   181.4 lb(Converted to: 181 lb 6 oz, 82.28 kg)    Height Measured :   64.5 in(Converted to: 5 ft 4 in, 163.83 cm)    Body Mass Index :   30.65 kg/m2 (HI)    Body Surface Area :   1.93 m2   Systolic Blood Pressure :   136 mmHg (HI)    Diastolic Blood Pressure :   80 mmHg   Mean Arterial Pressure :   99 mmHg   Peripheral Pulse Rate :   64 bpm   BP Site :   Right arm   Pulse Site :   Radial artery   BP Method :   Manual   HR Method :   Manual   Temperature Tympanic :   97.8 DegF(Converted to: 36.6 DegC)  (LOW)    Race :      Languages :   English   Ethnicity :   Not  or    Valencia Mann - 5/21/2019 7:57 AM CDT   Health Status   Allergies Verified? :   Yes   Medication History Verified? :   Yes   Immunizations Current :   Yes   Medical History Verified? :   Yes   Pre-Visit Planning Status :   Completed   Tobacco Use? :   Current every day smoker   Valencia Mann - 5/21/2019 7:57 AM CDT   Consents   Consent for Immunization Exchange :   Consent Granted   Consent for Immunizations to Providers :   Consent Granted   Valencia Mann - 5/21/2019 7:57 AM CDT   Meds / Allergies   (As Of: 5/21/2019 8:03:03 AM CDT)   Allergies (Active)   No known allergies  Estimated Onset Date:   Unspecified ; Created By:   Kyara Ness CMA; Reaction Status:   Active ; Category:   Drug ; Substance:   No known allergies ; Type:   Allergy ; Updated By:   Kyara Ness CMA; Source:   Paper Chart ; Reviewed Date:   5/21/2019 8:00 AM CDT        Medication List   (As Of: 5/21/2019 8:03:03 AM CDT)   Prescription/Discharge Order    omeprazole  :   omeprazole ; Status:   Prescribed ; Ordered As Mnemonic:   omeprazole 40 mg oral delayed release capsule ; Simple Display Line:   1 cap(s), Oral, daily, for  14 day(s), MED CHECK AND  FASTING LABS PER [facility]., 14 cap(s), 0 Refill(s) ; Ordering Provider:   Truong Fairbanks MD; Catalog Code:   omeprazole ; Order Dt/Tm:   4/15/2019 1:17:02 PM          atorvastatin  :   atorvastatin ; Status:   Prescribed ; Ordered As Mnemonic:   atorvastatin 40 mg oral tablet ; Simple Display Line:   1 tab(s), Oral, daily, 14 tab(s), 0 Refill(s) ; Ordering Provider:   Truong Fairbanks MD; Catalog Code:   atorvastatin ; Order Dt/Tm:   4/15/2019 1:14:47 PM          hydrochlorothiazide-lisinopril  :   hydrochlorothiazide-lisinopril ; Status:   Prescribed ; Ordered As Mnemonic:   hydrochlorothiazide-lisinopril 12.5 mg-10 mg oral tablet ; Simple Display Line:   2 tab(s), PO, Daily, 180 tab(s), 1 Refill(s) ; Ordering Provider:   Truong Fairbanks MD; Catalog Code:   hydrochlorothiazide-lisinopril ; Order Dt/Tm:   1/29/2019 3:38:20 PM          metFORMIN  :   metFORMIN ; Status:   Suspended ; Ordered As Mnemonic:   metFORMIN 500 mg oral tablet ; Simple Display Line:   1,000 mg, 2 tab(s), po, bid, for 90 day(s), 360 tab(s), 1 Refill(s) ; Ordering Provider:   Truong Fairbanks MD; Catalog Code:   metFORMIN ; Order Dt/Tm:   7/24/2017 5:33:12 PM            Home Meds    aspirin  :   aspirin ; Status:   Documented ; Ordered As Mnemonic:   aspirin ; Simple Display Line:   81 mg, Daily ; Catalog Code:   aspirin ; Order Dt/Tm:   8/5/2010 4:35:48 PM

## 2022-02-16 NOTE — CARE COORDINATION
Pt appears on [facility] chronic disease panel as out of parameters for HTN/A1C  Pt sent portal msg with BP numbers, last one was 4/12/18 and BP was 130/82.   Pt has RTC placed for chronic disease visit and fasting labs in 8/2018.

## 2022-02-16 NOTE — TELEPHONE ENCOUNTER
---------------------  From: Gretchen Mitchell MA (LEYIO Pool ([id number]_[facility]))   To: Truong Fairbanks MD;     Sent: 1/29/2019 1:13:27 PM CST  Subject: FW: FW: Dr. Fairbanks           ---------------------  From: BRENDA ANDRADE  To: LEYIO Pool ([id number]_[facility])  Sent: 01/29/2019 12:56 p.m. CST  Subject: RE: FW: Dr. Fairbanks  The double dose seems to have worked.  1/26 /80, 1/27 /79, 1/28 /78, 1/29 123/81.  Before you prescribe a double dose pill is there a way to find out if two of the same pills are cheaper than one pill double dose?  Thank you!       Addendum by Gretchen Mitchell MA on January 17, 2019 6:46:25 PM CST  ---------------------  From: Gretchen Mitchell MA ([employer] Message Pool ([id number]))  To: BRENDA ANDRADE  Sent: 1/17/2019 6:46:25 PM CST  Subject: FW: Brneda Mcarthur,       Dr. Fairbanks received your message from earlier today and he is suggesting that you double the dose of the Lisinopril/HCTZ to see if that helps lower your BP a little bit more.  Keep track of your BP and let us know in about 2-3 weeks how the increased dose is working.       Sincerely,       Gretchen PULIDO MA/Dr. Fairbanks       Addendum by Truong Fairbanks MD on January 17, 2019 3:53:25 PM CST  ---------------------  From: Truong Fairbanks MD  To: LEYIO Pool ([id number]_[facility]);  Sent: 1/17/2019 3:53:25 PM CST  Subject: RE: Dr. Fairbanks       Advise doubling dose of lisinopril/HCTZ and continue to monitor BP at home       Addendum by Gretchen Mitchell MA on January 17, 2019 1:47:28 PM CST  ---------------------  From: Gretchen Mitchell MA ([employer] Message Pool ([id number]_[facility]))  To: Truong Fairbanks MD;  Sent: 1/17/2019 1:47:28 PM CST  Subject: FW: Dr. Fairbanks  ---------------------  From: Nasrin Livingston CMA (Phone Messages Pool ([id number]_[place] [facility]))  To: [employer] Message Pool ([id number]_[place] [facility]);  Sent: 1/17/2019 7:57:12 AM  CST  Subject: FW: Dr. Fairbanks                      ---------------------  From: PAM ANDRADE  To: [facility]  Sent: 01/17/2019 07:06 a.m. CST  Subject: Dr. Fairbanks  Hi Dr. Fairbanks, here are some of my recent blood pressure readings.  I have a cuff at home and have taken readings at various times.  12/29 Morning during coffee 164/90  1/6 Mid morning after coffee 136/86  1/8 Before bed, resting 128/86  1/9 Noon about 30 minutes after exercise 125/86  1/11 After exercise 30 minutes 120/84  1/12 Noon 118/77 then an hour later 125/81 then a little later same day 101/68  1/14 early morning 5 am 131/84 then at noon 127/87  1/15 Noon about 30 mins after exercise 132/75  1/16 right after getting home from work 151/99 then half hour later 142/91 and then 2 hours later 134/82  1/17 early morning 5 am 134/85  I can continue to record readings and send them to you if you want me to.  It s too far for me to come to [place] just to get my BP read so prefer this method of that s ok.---------------------  From: Truong Fairbanks MD   To: [facility] [place] Pool ([id number]_[place] [facility]);     Sent: 1/29/2019 2:56:27 PM CST  Subject: RE: FW: Dr. Fairbanks     +Taking 2 of the current dose appears to be cheaper that the 20/25 dose.  OK to send in new prescription for 2 10/12.5 tabs daily, #180 with one refill  ** Submitted: **  Order:hydrochlorothiazide-lisinopril (hydrochlorothiazide-lisinopril 12.5 mg-10 mg oral tablet)  2 tab(s)  PO  Daily  Qty:  180 tab(s)        Refills:  1          Substitutions Allowed     Route To Pharmacy - [facility]    Signed by Gretchen Mitchell MA  1/29/2019 3:38:00 PM---------------------  From: Stephen BENEDICT, Gretchen ("[employer]" Message Pool ([id number]_[facility]))   To: PAM ANDRADE    Sent: 1/29/2019 3:48:14 PM CST  Subject: FW: FW: Dr. Fairbanks     Good Afternoon, Dr. Magdi Gutierrez received your message from earlier today and he did look  into how much cheaper it would be for you to take 2 of the 10/12.5mg dose as opposed to taking 1 of the 20/25mg dose.  We sent in a prescription refill to [facility] for the HCTZ/Lisinopril 12.5/10mg 2 tabs daily.  Please let us know if there is anything else we can do for you.    Sincerely,       Gretchen PULIDO MA/Dr. Fairbanks

## 2022-02-16 NOTE — CARE COORDINATION
Pt's BP elevated at 144/80.    Per [facility] note from 10/17:3. Essential Hypertension (I10) Stable, continue on current medication. Discussed labs today

## 2022-02-16 NOTE — TELEPHONE ENCOUNTER
---------------------  From: Janeen Beckford LPN (Phone Messages Pool ([id number]_[facility]))   To: [employer] Pool ([id number]_[place] [facility] [place]);     Sent: 10/16/2019 7:57:18 AM CDT  Subject: CONSUMER MESSAGE FW: Dr. Fairbanks           ---------------------  From: BRENDA ANDRADE  To: [facility]  Sent: 10/16/2019 07:52 a.m. CDT  Subject: Dr. Magdi Fairbanks, I just received a note regarding a colonoscopy that I m due for this year.  Can you please order it or whatever you do that gets the ball rolling?  Thank you!    (The letter says to see my physician but we talked about this in May and it s been 5 years since my first one.  At that time, I had polyps and they recommended another in 5 years.)    Have a great day!---------------------  From: Gretchen Mitchell MA ([employer] Message Pool ([id number]_[facility]))   To: BRENDA ANDRADE    Sent: 10/16/2019 9:10:45 AM CDT  Subject: FW: CONSUMER MESSAGE FW: Dr. Fairbanks     Good MorningBrenda,    We received your message and I reviewed your chart pertaining to your last colonoscopy.  It looks like you received some IV sedation along with Propofol to help with further sedation.  Because of that sedation that you received, we would need to see you in clinic for a preop exam to have a current heart and lung assessment done, and then we can order your colonoscopy.  We would need to do an EKG at this preop.  If you want to proceed with this, please either call the clinic or request an appointment via portal to get set up for a preop exam with Dr. Fairbanks, otherwise you can wait for you next check up in May.    Sincerely,      Gretchen PULIDO MA/Dr. Fairbanks